# Patient Record
Sex: MALE | Race: BLACK OR AFRICAN AMERICAN | URBAN - METROPOLITAN AREA
[De-identification: names, ages, dates, MRNs, and addresses within clinical notes are randomized per-mention and may not be internally consistent; named-entity substitution may affect disease eponyms.]

---

## 2017-12-12 ENCOUNTER — NURSE TRIAGE (OUTPATIENT)
Dept: NURSING | Facility: CLINIC | Age: 42
End: 2017-12-12

## 2017-12-12 NOTE — TELEPHONE ENCOUNTER
"History or rectal fissure. Has had severe bleeding and pain with bowel movements for about 2 months. I instructed ER now. Fermin stated understanding and agreement.  Qi FARRIS RN [facility]         Reason for Disposition    SEVERE rectal bleeding (large blood clots; on and off, or constant bleeding)    Additional Information    Negative: Shock suspected (e.g., cold/pale/clammy skin, too weak to stand, low BP, rapid pulse)    Negative: Difficult to awaken or acting confused  (e.g., disoriented, slurred speech)    Negative: Passed out (i.e., lost consciousness, collapsed and was not responding)    Negative: [1] Vomiting AND [2] contains red blood or black (\"coffee ground\") material  (Exception: few red streaks in vomit that only happened once)    Negative: Sounds like a life-threatening emergency to the triager    Negative: Diarrhea is main symptom    Negative: Stool color other than brown or tan is main concern  (no bleeding and no melena)    Protocols used: RECTAL BLEEDING-ADULT-    "

## 2017-12-14 ENCOUNTER — HOSPITAL ENCOUNTER (EMERGENCY)
Facility: CLINIC | Age: 42
Discharge: HOME | End: 2017-12-14
Attending: EMERGENCY MEDICINE | Admitting: EMERGENCY MEDICINE
Payer: COMMERCIAL

## 2017-12-14 VITALS
OXYGEN SATURATION: 98 % | RESPIRATION RATE: 18 BRPM | WEIGHT: 122.2 LBS | SYSTOLIC BLOOD PRESSURE: 103 MMHG | HEART RATE: 88 BPM | TEMPERATURE: 98.3 F | DIASTOLIC BLOOD PRESSURE: 57 MMHG

## 2017-12-14 DIAGNOSIS — K60.2 ANAL FISSURE: ICD-10-CM

## 2017-12-14 LAB
ANION GAP SERPL CALCULATED.3IONS-SCNC: 8 MMOL/L (ref 3–14)
BASOPHILS # BLD AUTO: 0.1 10E9/L (ref 0–0.2)
BASOPHILS NFR BLD AUTO: 0.7 %
BUN SERPL-MCNC: 13 MG/DL (ref 7–30)
CALCIUM SERPL-MCNC: 8.9 MG/DL (ref 8.5–10.1)
CHLORIDE SERPL-SCNC: 103 MMOL/L (ref 94–109)
CO2 SERPL-SCNC: 30 MMOL/L (ref 20–32)
CREAT SERPL-MCNC: 1.01 MG/DL (ref 0.66–1.25)
DIFFERENTIAL METHOD BLD: NORMAL
EOSINOPHIL # BLD AUTO: 0.2 10E9/L (ref 0–0.7)
EOSINOPHIL NFR BLD AUTO: 2.2 %
ERYTHROCYTE [DISTWIDTH] IN BLOOD BY AUTOMATED COUNT: 12.8 % (ref 10–15)
GFR SERPL CREATININE-BSD FRML MDRD: 80 ML/MIN/1.7M2
GLUCOSE SERPL-MCNC: 82 MG/DL (ref 70–99)
HCT VFR BLD AUTO: 44.3 % (ref 40–53)
HGB BLD-MCNC: 14.8 G/DL (ref 13.3–17.7)
IMM GRANULOCYTES # BLD: 0 10E9/L (ref 0–0.4)
IMM GRANULOCYTES NFR BLD: 0.1 %
INR PPP: 1.02 (ref 0.86–1.14)
LYMPHOCYTES # BLD AUTO: 2.1 10E9/L (ref 0.8–5.3)
LYMPHOCYTES NFR BLD AUTO: 26.5 %
MCH RBC QN AUTO: 31 PG (ref 26.5–33)
MCHC RBC AUTO-ENTMCNC: 33.4 G/DL (ref 31.5–36.5)
MCV RBC AUTO: 93 FL (ref 78–100)
MONOCYTES # BLD AUTO: 0.6 10E9/L (ref 0–1.3)
MONOCYTES NFR BLD AUTO: 7.2 %
NEUTROPHILS # BLD AUTO: 5.1 10E9/L (ref 1.6–8.3)
NEUTROPHILS NFR BLD AUTO: 63.3 %
NRBC # BLD AUTO: 0 10*3/UL
NRBC BLD AUTO-RTO: 0 /100
PLATELET # BLD AUTO: 207 10E9/L (ref 150–450)
POTASSIUM SERPL-SCNC: 3.8 MMOL/L (ref 3.4–5.3)
RBC # BLD AUTO: 4.77 10E12/L (ref 4.4–5.9)
SODIUM SERPL-SCNC: 141 MMOL/L (ref 133–144)
TSH SERPL DL<=0.005 MIU/L-ACNC: 1.43 MU/L (ref 0.4–4)
WBC # BLD AUTO: 8 10E9/L (ref 4–11)

## 2017-12-14 PROCEDURE — 80048 BASIC METABOLIC PNL TOTAL CA: CPT | Performed by: EMERGENCY MEDICINE

## 2017-12-14 PROCEDURE — 85025 COMPLETE CBC W/AUTO DIFF WBC: CPT | Performed by: EMERGENCY MEDICINE

## 2017-12-14 PROCEDURE — 99284 EMERGENCY DEPT VISIT MOD MDM: CPT | Mod: Z6 | Performed by: EMERGENCY MEDICINE

## 2017-12-14 PROCEDURE — 99283 EMERGENCY DEPT VISIT LOW MDM: CPT | Performed by: EMERGENCY MEDICINE

## 2017-12-14 PROCEDURE — 85610 PROTHROMBIN TIME: CPT | Performed by: EMERGENCY MEDICINE

## 2017-12-14 PROCEDURE — 84443 ASSAY THYROID STIM HORMONE: CPT | Performed by: EMERGENCY MEDICINE

## 2017-12-14 PROCEDURE — 25000132 ZZH RX MED GY IP 250 OP 250 PS 637: Performed by: EMERGENCY MEDICINE

## 2017-12-14 RX ORDER — ACETAMINOPHEN 500 MG
1000 TABLET ORAL ONCE
Status: COMPLETED | OUTPATIENT
Start: 2017-12-14 | End: 2017-12-14

## 2017-12-14 RX ORDER — NITROGLYCERIN 4 MG/G
1 OINTMENT RECTAL EVERY 12 HOURS
Qty: 30 G | Refills: 0 | Status: SHIPPED | OUTPATIENT
Start: 2017-12-14

## 2017-12-14 RX ADMIN — ACETAMINOPHEN 1000 MG: 500 TABLET ORAL at 19:26

## 2017-12-14 ASSESSMENT — ENCOUNTER SYMPTOMS
DIARRHEA: 0
COLOR CHANGE: 0
BLOOD IN STOOL: 1
FEVER: 0
NECK STIFFNESS: 0
CONFUSION: 0
ABDOMINAL PAIN: 0
EYE REDNESS: 0
SHORTNESS OF BREATH: 0
ANAL BLEEDING: 1
DIFFICULTY URINATING: 0
CONSTIPATION: 1
HEADACHES: 0
RECTAL PAIN: 1
ARTHRALGIAS: 0

## 2017-12-14 NOTE — ED AVS SNAPSHOT
[facility], Emergency Department    [address]    [place] [address]    Phone:  [phone]    Fax:  [phone]                                       Fermin Atwood   MRN: [id number]    Department:  [facility], Emergency Department   Date of Visit:  12/14/2017           After Visit Summary Signature Page     I have received my discharge instructions, and my questions have been answered. I have discussed any challenges I see with this plan with the nurse or doctor.    ..........................................................................................................................................  Patient/Patient Representative Signature      ..........................................................................................................................................  Patient Representative Print Name and Relationship to Patient    ..................................................               ................................................  Date                                            Time    ..........................................................................................................................................  Reviewed by Signature/Title    ...................................................              ..............................................  Date                                                            Time

## 2017-12-14 NOTE — ED AVS SNAPSHOT
[facility], Emergency Department    [address]    [facility] [place] [place] [address]    Phone:  [phone]    Fax:  [phone]                                       Fermin Atwood   MRN: [id number]    Department:  [facility], Emergency Department   Date of Visit:  12/14/2017           Patient Information     Date Of Birth          1975        Your diagnoses for this visit were:     Anal fissure        You were seen by Mesfin Mcnair MD.        Discharge Instructions       Continue stool softener.  Apply nitroglycerin ointment twice daily as directed.  Apply lidocaine jelly 3 times daily as needed for pain  Take Citrucel or increase fiber as directed.  Drink plenty of fluids.    Please make an appointment to follow up with [facility] (phone: [phone]). Call tomorrow for an appointment.     Discharge References/Attachments     SITZ BATH, TAKING A (ENGLISH)      24 Hour Appointment Hotline       To make an appointment at any [facility] clinic, call [phone] ([phone]). If you don't have a family doctor or clinic, we will help you find one. [facility] clinics are conveniently located to serve the needs of you and your family.             Review of your medicines      START taking        Dose / Directions Last dose taken    lidocaine 2 % topical gel   Commonly known as:  XYLOCAINE   Quantity:  30 mL        Apply topically 3 times daily Place a pea sized amount on gloved finger and apply rectally.   Refills:  0        methylcellulose 500 MG Tabs tablet   Commonly known as:  CITRUCEL   Dose:  500 mg   Quantity:  30 each        Take 1 tablet (500 mg) by mouth daily   Refills:  0        nitroGLYcerin 0.4 % Oint rectal ointment   Commonly known as:  RECTIV   Dose:  1 inch   Quantity:  30 g        Place 1 inch (1.5 mg) rectally every 12 hours .  Wear a glove when applying.   Refills:  0                Prescriptions were sent or printed at these locations (3 Prescriptions)             "       Other Prescriptions                Printed at Department/Unit printer (3 of 3)         nitroGLYcerin (RECTIV) 0.4 % OINT rectal ointment               methylcellulose (CITRUCEL) 500 MG TABS tablet               lidocaine (XYLOCAINE) 2 % topical gel                Procedures and tests performed during your visit     Basic metabolic panel    CBC with platelets differential    INR    Peripheral IV catheter    TSH with free T4 reflex      Orders Needing Specimen Collection     None      Pending Results     No orders found from 12/12/2017 to 12/15/2017.            Pending Culture Results     No orders found from 12/12/2017 to 12/15/2017.            Pending Results Instructions     If you had any lab results that were not finalized at the time of your Discharge, you can call the ED Lab Result RN at [phone]. You will be contacted by this team for any positive Lab results or changes in treatment. The nurses are available 7 days a week from 10A to 6:30P.  You can leave a message 24 hours per day and they will return your call.        Thank you for choosing [facility]       Thank you for choosing [facility] for your care. Our goal is always to provide you with excellent care. Hearing back from our patients is one way we can continue to improve our services. Please take a few minutes to complete the written survey that you may receive in the mail after you visit with us. Thank you!        [employer] Information     [email or web address] lets you send messages to your doctor, view your test results, renew your prescriptions, schedule appointments and more. To sign up, go to [email or web address] . Click on \"Log in\" on the left side of the screen, which will take you to the Welcome page. Then click on \"Sign up Now\" on the right side of the page.     You will be asked to enter the access code listed below, as well as some personal information. Please follow the directions to create your username and password.     Your access code is: " [email or web address]  Expires: 3/14/2018  8:20 PM     Your access code will  in 90 days. If you need help or a new code, please call your [place] clinic or [phone].        Care EveryWhere ID     This is your Care EveryWhere ID. This could be used by other organizations to access your [place] medical records  [id number]        Equal Access to Services     [facility] HospitalMACRINA : Hadii karl velazquez Somillie, waaxda luqadaha, qaybta kaalmada adejacekda, nelia corbett . So [facility] [phone].    ATENCIÓN: Si habla español, tiene a branch disposición servicios gratuitos de asistencia lingüística. Llame al [phone].    We comply with applicable federal civil rights laws and [place] laws. We do not discriminate on the basis of race, color, national origin, age, disability, sex, sexual orientation, or gender identity.            After Visit Summary       This is your record. Keep this with you and show to your community pharmacist(s) and doctor(s) at your next visit.

## 2017-12-15 NOTE — TELEPHONE ENCOUNTER
APPT INFO    Date /Time: 12/29/17 1:45PM   Reason for Appt: In ED on 12/12/17 for SEVERE rectal bleeding    Ref Provider/Clinic: ED F/u appt    Are there internal records? Yes/No?  IF YES, list clinic names: [facility] ED Note 12/14/17   Examination under anesthesia, sphincterotomy, and proctoscopy Jose L MOYER 6/17/2009   Are there outside records? Yes/No? No external recs    Patient Contact (Y/N) & Call Details: No, pt f/u from ED Visit   Action: Closing encounter

## 2017-12-15 NOTE — DISCHARGE INSTRUCTIONS
Continue stool softener.  Apply nitroglycerin ointment twice daily as directed.  Apply lidocaine jelly 3 times daily as needed for pain  Take Citrucel or increase fiber as directed.  Drink plenty of fluids.    Please make an appointment to follow up with [facility] (phone: [phone]). Call tomorrow for an appointment.

## 2017-12-15 NOTE — ED PROVIDER NOTES
History     Chief Complaint   Patient presents with     Rectal Bleeding     increased bleeding, pain w/b.m. x2mos. Hx several surgeries for anal fissure, last was 2009. Pt also reports recent weight loss.      ROSALBA Atwood is a 42 year old male who is emergency department with a 3 month history of rectal pain and rectal bleeding after bowel movements.  Patient has a history of anal fissure.  He has had 3 procedures previously, most recently in 2009 when he had an EUA, sphincterotomy, and proctoscopy.  Patient states that for the past 3 months, he has been having rectal pain after he has a bowel movement.  He states that he has also had red bleeding after bowel movements.  He states the amount of bleeding is typically related to how constipated he is.  He states he is currently taking stool softeners.  Patient states that he called the clinic today to make an appointment and was referred to the emergency department.  He states that his symptoms are not particularly bad today.  He denies any abdominal pain.  He currently has some rectal pain.  He denies any fever.  He denies nausea vomiting.  Denies any chest pain or dyspnea.  He denies lightheadedness.  Patient denies anticoagulant use.    I have reviewed the Medications, Allergies, Past Medical and Surgical History, and Social History in the Epic system.    Review of Systems   Constitutional: Negative for fever.   HENT: Negative for congestion.    Eyes: Negative for redness.   Respiratory: Negative for shortness of breath.    Cardiovascular: Negative for chest pain.   Gastrointestinal: Positive for anal bleeding, blood in stool, constipation and rectal pain. Negative for abdominal pain and diarrhea.   Genitourinary: Negative for difficulty urinating.   Musculoskeletal: Negative for arthralgias and neck stiffness.   Skin: Negative for color change.   Neurological: Negative for headaches.   Psychiatric/Behavioral: Negative for confusion.   All other  systems reviewed and are negative.      Physical Exam   BP: 122/56  Pulse: 102  Temp: 97.3  F (36.3  C)  Resp: 16  Weight: 55.4 kg (122 lb 3.2 oz)  SpO2: 97 %      Physical Exam   Constitutional: He appears well-developed and well-nourished.   Cardiovascular: Normal rate, regular rhythm and normal heart sounds.    Pulmonary/Chest: Effort normal and breath sounds normal. No respiratory distress.   Abdominal: Soft. Bowel sounds are normal. There is no tenderness.   Genitourinary: Rectal exam shows fissure (anterior), tenderness and guaiac positive stool. Rectal exam shows no external hemorrhoid and no mass.   Neurological: He is alert. He has normal strength.   Skin: Skin is warm and dry.   Nursing note and vitals reviewed.      ED Course     ED Course     Procedures            Critical Care time:    Results for orders placed or performed during the hospital encounter of 12/14/17   CBC with platelets differential   Result Value Ref Range    WBC 8.0 4.0 - 11.0 10e9/L    RBC Count 4.77 4.4 - 5.9 10e12/L    Hemoglobin 14.8 13.3 - 17.7 g/dL    Hematocrit 44.3 40.0 - 53.0 %    MCV 93 78 - 100 fl    MCH 31.0 26.5 - 33.0 pg    MCHC 33.4 31.5 - 36.5 g/dL    RDW 12.8 10.0 - 15.0 %    Platelet Count 207 150 - 450 10e9/L    Diff Method Automated Method     % Neutrophils 63.3 %    % Lymphocytes 26.5 %    % Monocytes 7.2 %    % Eosinophils 2.2 %    % Basophils 0.7 %    % Immature Granulocytes 0.1 %    Nucleated RBCs 0 0 /100    Absolute Neutrophil 5.1 1.6 - 8.3 10e9/L    Absolute Lymphocytes 2.1 0.8 - 5.3 10e9/L    Absolute Monocytes 0.6 0.0 - 1.3 10e9/L    Absolute Eosinophils 0.2 0.0 - 0.7 10e9/L    Absolute Basophils 0.1 0.0 - 0.2 10e9/L    Abs Immature Granulocytes 0.0 0 - 0.4 10e9/L    Absolute Nucleated RBC 0.0    Basic metabolic panel   Result Value Ref Range    Sodium 141 133 - 144 mmol/L    Potassium 3.8 3.4 - 5.3 mmol/L    Chloride 103 94 - 109 mmol/L    Carbon Dioxide 30 20 - 32 mmol/L    Anion Gap 8 3 - 14 mmol/L     Glucose 82 70 - 99 mg/dL    Urea Nitrogen 13 7 - 30 mg/dL    Creatinine 1.01 0.66 - 1.25 mg/dL    GFR Estimate 80 >60 mL/min/1.7m2    GFR Estimate If Black >90 >60 mL/min/1.7m2    Calcium 8.9 8.5 - 10.1 mg/dL   INR   Result Value Ref Range    INR 1.02 0.86 - 1.14   TSH with free T4 reflex   Result Value Ref Range    TSH 1.43 0.40 - 4.00 mU/L               Assessments & Plan (with Medical Decision Making)   42 year old male with history of anal fissure to the emergency department with 3 months of rectal pain and bleeding after constipated bowel movements for the past 3 months.  In the emergency department, the patient has tenderness of the anal sphincter anteriorly consistent with an anal fissure.  His abdomen and remainder of his rectal exam is normal.  Patient's labs are normal.  Patient will be discharged to home with nitroglycerin ointment as well as lidocaine gel.  He will continue oral stool softeners.  Methylcellulose supplement also prescribed.  Colorectal surgery follow-up recommended.    I have reviewed the nursing notes.    I have reviewed the findings, diagnosis, plan and need for follow up with the patient.    Discharge Medication List as of 12/14/2017  8:20 PM      START taking these medications    Details   nitroGLYcerin (RECTIV) 0.4 % OINT rectal ointment Place 1 inch (1.5 mg) rectally every 12 hours .  Wear a glove when applying., Disp-30 g, R-0, Local Print      methylcellulose (CITRUCEL) 500 MG TABS tablet Take 1 tablet (500 mg) by mouth daily, Disp-30 each, R-0, Local Print      lidocaine (XYLOCAINE) 2 % topical gel Apply topically 3 times daily Place a pea sized amount on gloved finger and apply rectally.Disp-30 mL, R-0Local Print             Final diagnoses:   Anal fissure       12/14/2017   [facility], [place], EMERGENCY DEPARTMENT     Mesfin Mcnair MD  12/14/17 7233

## 2017-12-29 ENCOUNTER — OFFICE VISIT (OUTPATIENT)
Dept: SURGERY | Facility: CLINIC | Age: 42
End: 2017-12-29
Payer: COMMERCIAL

## 2017-12-29 ENCOUNTER — PRE VISIT (OUTPATIENT)
Dept: SURGERY | Facility: CLINIC | Age: 42
End: 2017-12-29

## 2017-12-29 VITALS
HEART RATE: 67 BPM | OXYGEN SATURATION: 98 % | WEIGHT: 124.9 LBS | SYSTOLIC BLOOD PRESSURE: 115 MMHG | DIASTOLIC BLOOD PRESSURE: 88 MMHG | HEIGHT: 63 IN | TEMPERATURE: 97.5 F | BODY MASS INDEX: 22.13 KG/M2

## 2017-12-29 DIAGNOSIS — K62.89 ANAL PAIN: Primary | ICD-10-CM

## 2017-12-29 DIAGNOSIS — K62.5 RECTAL BLEEDING: ICD-10-CM

## 2017-12-29 ASSESSMENT — ENCOUNTER SYMPTOMS
HEARTBURN: 1
BLOOD IN STOOL: 1
DIARRHEA: 0
BLOATING: 0
CONSTIPATION: 0
NAUSEA: 0
BOWEL INCONTINENCE: 0
VOMITING: 0
RECTAL PAIN: 1
ABDOMINAL PAIN: 0
JAUNDICE: 0

## 2017-12-29 ASSESSMENT — PAIN SCALES - GENERAL: PAINLEVEL: MODERATE PAIN (5)

## 2017-12-29 NOTE — PROGRESS NOTES
Colon and Rectal Surgery Consult Clinic Note    Date: 2017     Referring provider:  Referred Self, MD  No address on file     RE: Fermin Atwood  : 1975  RUBEN: 2017    Fermin Atwood is a very pleasant 42 year old male with a history of recurrent anal fissures with a recent diagnosis of anal fissure.  Given these findings they were subsequently sent to the [facility] for an opinion on this and a new patient consultation.     Fermin presents today with his son.  He has a history of recurrent anal fissures.  He had 2 surgeries in [facility] for this and a surgery in [place].  In  he underwent examination under anesthesia, sphincterotomy, and proctoscopy with Dr. Domingo for anal fissure.  He had been doing well until about 3 months ago when his pain and bleeding returned.  This occurred after hard bowel movements.  He now has sharp pain with bowel movements and bright red blood after bowel movements.  He states that the amount of bleeding seems to be related to how constipated he has.  He had a soft bowel movement today and had no bleeding and no pain with it.  He was seen in the emergency room on 2017 and diagnosed with an anal fissure.  He was prescribed nitroglycerin cream but states that he has only used this a few times.  He reports that he had a colonoscopy back before his surgery in  and that was normal, although there is no record in our system from this.  He denies any family history of any colon cancer.    Assessment/Plan: 42 year old male without a significant past medical history with rectal pain and bleeding.  Exam is very limited due to patient discomfort.  He has large internal hemorrhoids and some scarring in the right lateral position.  I cannot clearly identify an anal fissure but his symptoms seem most consistent with that especially in the setting of constipation.  I recommended that we treat for an anal fissure as he has had recurrent  "fissures in the past.  Will treat with topical diltiazem.  Discussed the importance of long-term management of constipation.  Would like him to take a fiber supplement 2-3 times a day and add in the laxative in addition as needed.  Warm tub baths, Tylenol, and ibuprofen for any pain.  If symptoms do not improve after 4 weeks, would consider EUA to further evaluate for other sources of pain.  I discussed that he does have large internal hemorrhoids but I think it is unlikely that these are causing his pain but they could be contributing to a fissure having difficulty healing.  Patient's questions were answered to his stated satisfaction and he is in agreement with this plan.    Medical history:  No past medical history on file.    Surgical history:  No past surgical history on file.    Problem list:  There are no active problems to display for this patient.      Medications:  Current Outpatient Prescriptions   Medication Sig Dispense Refill     diltiazem 2% in PLO cream, FV COMPOUNDED, 2% GEL To anal opening three times daily.  Use a pea-sized amount.  Store at room temperature. 60 g 0     diltiazem 2% in PLO cream, FV COMPOUNDED, 2% GEL To anal opening three times daily.  Use a pea-sized amount.  Store at room temperature. 60 g 0     nitroGLYcerin (RECTIV) 0.4 % OINT rectal ointment Place 1 inch (1.5 mg) rectally every 12 hours .  Wear a glove when applying. 30 g 0     methylcellulose (CITRUCEL) 500 MG TABS tablet Take 1 tablet (500 mg) by mouth daily 30 each 0     lidocaine (XYLOCAINE) 2 % topical gel Apply topically 3 times daily Place a pea sized amount on gloved finger and apply rectally. 30 mL 0       Allergies:  Allergies   Allergen Reactions     Aspirin Swelling     \"my face gets swollen\"     Ibuprofen Swelling     \"My face gets swollen\"       Family history:  No family history on file.    Social history:  Social History   Substance Use Topics     Smoking status: Current Some Day Smoker     Smokeless tobacco: " "Never Used     Alcohol use No    Marital status: .    Nursing Notes:   Divya Freeman LPN  12/29/2017  2:12 PM  Signed  Chief Complaint   Patient presents with     Clinic Care Coordination - Initial     new       Vitals:    12/29/17 1409   BP: 115/88   Pulse: 67   Temp: 97.5  F (36.4  C)   TempSrc: Oral   SpO2: 98%   Weight: 56.7 kg (124 lb 14.4 oz)   Height: 1.6 m (5' 3\")       Body mass index is 22.13 kg/(m^2).      Divya CHEW LPN                          Physical Examination:  /88  Pulse 67  Temp 97.5  F (36.4  C) (Oral)  Ht 5' 3\"  Wt 124 lb 14.4 oz  SpO2 98%  BMI 22.13 kg/m2  General: Alert, oriented, in no acute distress, sitting comfortably  HEENT: Mucous membranes moist  Perianal external examination:  Perianal skin: Intact with no excoriation or lichenification.  Lesions: No evidence of an external lesion, nodularity, or induration in the perianal region.  Eversion of buttocks: There is some scarring in the right lateral position and prolapsing internal hemorrhoids present without bleeding.  Exam limited due to the extent of hemorrhoids.    Digital rectal examination: Was performed.   Sphincter tone: Hypertonic.  Palpable lesions: No.  Prostate: Not assessed.  Other: None.      Anoscopy: Was performed but was very limited due to discomfort.  Large internal hemorrhoids visualized without any active bleeding.      Total face to face time was 30 minutes, >50% counseling.    SABINA Morin, NP-C  Colon and Rectal Surgery   [facility]    This note was created using speech recognition software and may contain unintended word substitutions.  "

## 2017-12-29 NOTE — PATIENT INSTRUCTIONS
1. Diltiazem ointment 2% to be applied 3 times daily for 8 weeks  2. Fiber supplement such as Metamcuil, Citrucel, or Benefiber powder once to twice a day  3. MiraLax or Milk of Magnesia if still constipated  4. Drink 10 glasses of water a day  5. Tylenol, ibuprofen, and warm tub baths/sitz baths for pain  6. Follow up in 8 weeks. Follow up sooner if symptoms do not improve after 4 weeks.

## 2017-12-29 NOTE — MR AVS SNAPSHOT
After Visit Summary   2017    Fermin Atwood    MRN: [id number]           Patient Information     Date Of Birth          1975        Visit Information        Provider Department      2017 2:00 PM Louise Aviles APRN [facility] Colon and Rectal Surgery        Today's Diagnoses     Anal pain    -  1      Care Instructions    1. Diltiazem ointment 2% to be applied 3 times daily for 8 weeks  2. Fiber supplement such as Metamcuil, Citrucel, or Benefiber powder once to twice a day  3. MiraLax or Milk of Magnesia if still constipated  4. Drink 10 glasses of water a day  5. Tylenol, ibuprofen, and warm tub baths/sitz baths for pain  6. Follow up in 8 weeks. Follow up sooner if symptoms do not improve after 4 weeks.            Follow-ups after your visit        Who to contact     Please call your clinic at [phone] to:    Ask questions about your health    Make or cancel appointments    Discuss your medicines    Learn about your test results    Speak to your doctor   If you have compliments or concerns about an experience at your clinic, or if you wish to file a complaint, please contact [facility] Patient Relations at [phone] or email us at [email or web address]         Additional Information About Your Visit        MyChart Information     Maktoob is an electronic gateway that provides easy, online access to your medical records. With Maktoob, you can request a clinic appointment, read your test results, renew a prescription or communicate with your care team.     To sign up for Maktoob visit the website at [email or web address]   You will be asked to enter the access code listed below, as well as some personal information. Please follow the directions to create your username and password.     Your access code is: [email or web address]  Expires: 3/14/2018  8:20 PM     Your access code will  in 90 days. If you need help or a  "new code, please contact your [facility] or call [phone] for assistance.        Care EveryWhere ID     This is your Care EveryWhere ID. This could be used by other organizations to access your [place] medical records  [id number]        Your Vitals Were     Pulse Temperature Height Pulse Oximetry BMI (Body Mass Index)       67 97.5  F (36.4  C) (Oral) 5' 3\" 98% 22.13 kg/m2        Blood Pressure from Last 3 Encounters:   12/29/17 115/88   12/14/17 103/57    Weight from Last 3 Encounters:   12/29/17 124 lb 14.4 oz   12/14/17 122 lb 3.2 oz              Today, you had the following     No orders found for display         Today's Medication Changes          These changes are accurate as of: 12/29/17  2:45 PM.  If you have any questions, ask your nurse or doctor.               Start taking these medicines.        Dose/Directions    * diltiazem 2% in PLO cream (FV COMPOUNDED) 2% Gel   Used for:  Anal pain   Started by:  Louise Aviles APRN CNP        To anal opening three times daily.  Use a pea-sized amount.  Store at room temperature.   Quantity:  60 g   Refills:  0       * diltiazem 2% in PLO cream (FV COMPOUNDED) 2% Gel   Used for:  Anal pain   Started by:  Louise Aviles APRN CNP        To anal opening three times daily.  Use a pea-sized amount.  Store at room temperature.   Quantity:  60 g   Refills:  0       * Notice:  This list has 2 medication(s) that are the same as other medications prescribed for you. Read the directions carefully, and ask your doctor or other care provider to review them with you.         Where to get your medicines      These medications were sent to [facility] - [place], [place] - [address]   [address] , [place] [facility] [address]     Phone:  [phone]     diltiazem 2% in PLO cream (FV COMPOUNDED) 2% Gel         Some of these will need a paper prescription and others can be bought over the " counter.  Ask your nurse if you have questions.     Bring a paper prescription for each of these medications     diltiazem 2% in PLO cream (FV COMPOUNDED) 2% Gel                Primary Care Provider Fax #    Physician No Ref-Primary [phone]       No address on file        Equal Access to Services     RAUL TORSTEN : Hadii karl mcbride caroline Sosallyali, waaxda luqadaha, qaybta kaalmada adeegyada, nelia donavonin hayaaerin valadezmonica alvarado aric arnold. So [facility] [phone].    ATENCIÓN: Si habla español, tiene a branch disposición servicios gratuitos de asistencia lingüística. Llame al [phone].    We comply with applicable federal civil rights laws and [place] laws. We do not discriminate on the basis of race, color, national origin, age, disability, sex, sexual orientation, or gender identity.            Thank you!     Thank you for choosing [facility] COLON AND RECTAL SURGERY  for your care. Our goal is always to provide you with excellent care. Hearing back from our patients is one way we can continue to improve our services. Please take a few minutes to complete the written survey that you may receive in the mail after your visit with us. Thank you!             Your Updated Medication List - Protect others around you: Learn how to safely use, store and throw away your medicines at www.disposemymeds.org.          This list is accurate as of: 12/29/17  2:45 PM.  Always use your most recent med list.                   Brand Name Dispense Instructions for use Diagnosis    * diltiazem 2% in PLO cream (FV COMPOUNDED) 2% Gel     60 g    To anal opening three times daily.  Use a pea-sized amount.  Store at room temperature.    Anal pain       * diltiazem 2% in PLO cream (FV COMPOUNDED) 2% Gel     60 g    To anal opening three times daily.  Use a pea-sized amount.  Store at room temperature.    Anal pain       lidocaine 2 % topical gel    XYLOCAINE    30 mL    Apply topically 3 times daily Place a pea sized amount on gloved finger and  apply rectally.        methylcellulose 500 MG Tabs tablet    CITRUCEL    30 each    Take 1 tablet (500 mg) by mouth daily        nitroGLYcerin 0.4 % Oint rectal ointment    RECTIV    30 g    Place 1 inch (1.5 mg) rectally every 12 hours .  Wear a glove when applying.        * Notice:  This list has 2 medication(s) that are the same as other medications prescribed for you. Read the directions carefully, and ask your doctor or other care provider to review them with you.

## 2017-12-29 NOTE — NURSING NOTE
"Chief Complaint   Patient presents with     Clinic Care Coordination - Initial     new       Vitals:    12/29/17 1409   BP: 115/88   Pulse: 67   Temp: 97.5  F (36.4  C)   TempSrc: Oral   SpO2: 98%   Weight: 56.7 kg (124 lb 14.4 oz)   Height: 1.6 m (5' 3\")       Body mass index is 22.13 kg/(m^2).      Divya CHEW LPN                       "

## 2017-12-29 NOTE — LETTER
2017      RE: Fermin Atwood  [address]   [place] [address]       Colon and Rectal Surgery Consult Clinic Note    Date: 2017     Referring provider:  Referred Self, MD  No address on file     RE: Fermin Atwood  : 1975  RUBEN: 2017    Fermin Atwood is a very pleasant 42 year old male with a history of recurrent anal fissures with a recent diagnosis of anal fissure.  Given these findings they were subsequently sent to the Colon and Rectal Surgery Clinic for an opinion on this and a new patient consultation.     Fermin presents today with his son.  He has a history of recurrent anal fissures.  He had 2 surgeries in [facility] for this and a surgery in [place].  In  he underwent examination under anesthesia, sphincterotomy, and proctoscopy with Dr. Domingo for anal fissure.  He had been doing well until about 3 months ago when his pain and bleeding returned.  This occurred after hard bowel movements.  He now has sharp pain with bowel movements and bright red blood after bowel movements.  He states that the amount of bleeding seems to be related to how constipated he has.  He had a soft bowel movement today and had no bleeding and no pain with it.  He was seen in the emergency room on 2017 and diagnosed with an anal fissure.  He was prescribed nitroglycerin cream but states that he has only used this a few times.  He reports that he had a colonoscopy back before his surgery in  and that was normal, although there is no record in our system from this.  He denies any family history of any colon cancer.    Assessment/Plan: 42 year old male without a significant past medical history with rectal pain and bleeding.  Exam is very limited due to patient discomfort.  He has large internal hemorrhoids and some scarring in the right lateral position.  I cannot clearly identify an anal fissure but his symptoms seem most consistent with that especially in the  "setting of constipation.  I recommended that we treat for an anal fissure as he has had recurrent fissures in the past.  Will treat with topical diltiazem.  Discussed the importance of long-term management of constipation.  Would like him to take a fiber supplement 2-3 times a day and add in the laxative in addition as needed.  Warm tub baths, Tylenol, and ibuprofen for any pain.  If symptoms do not improve after 4 weeks, would consider EUA to further evaluate for other sources of pain.  I discussed that he does have large internal hemorrhoids but I think it is unlikely that these are causing his pain but they could be contributing to a fissure having difficulty healing.  Patient's questions were answered to his stated satisfaction and he is in agreement with this plan.    Medical history:  No past medical history on file.    Surgical history:  No past surgical history on file.    Problem list:  There are no active problems to display for this patient.      Medications:  Current Outpatient Prescriptions   Medication Sig Dispense Refill     diltiazem 2% in PLO cream, FV COMPOUNDED, 2% GEL To anal opening three times daily.  Use a pea-sized amount.  Store at room temperature. 60 g 0     diltiazem 2% in PLO cream, FV COMPOUNDED, 2% GEL To anal opening three times daily.  Use a pea-sized amount.  Store at room temperature. 60 g 0     nitroGLYcerin (RECTIV) 0.4 % OINT rectal ointment Place 1 inch (1.5 mg) rectally every 12 hours .  Wear a glove when applying. 30 g 0     methylcellulose (CITRUCEL) 500 MG TABS tablet Take 1 tablet (500 mg) by mouth daily 30 each 0     lidocaine (XYLOCAINE) 2 % topical gel Apply topically 3 times daily Place a pea sized amount on gloved finger and apply rectally. 30 mL 0       Allergies:  Allergies   Allergen Reactions     Aspirin Swelling     \"my face gets swollen\"     Ibuprofen Swelling     \"My face gets swollen\"       Family history:  No family history on file.    Social history:  Social " "History   Substance Use Topics     Smoking status: Current Some Day Smoker     Smokeless tobacco: Never Used     Alcohol use No    Marital status: .    Nursing Notes:   Divya FreemanAMAIRANI  12/29/2017  2:12 PM  Signed  Chief Complaint   Patient presents with     Clinic Care Coordination - Initial     new       Vitals:    12/29/17 1409   BP: 115/88   Pulse: 67   Temp: 97.5  F (36.4  C)   TempSrc: Oral   SpO2: 98%   Weight: 56.7 kg (124 lb 14.4 oz)   Height: 1.6 m (5' 3\")       Body mass index is 22.13 kg/(m^2).      Divya CHEW LPN                          Physical Examination:  /88  Pulse 67  Temp 97.5  F (36.4  C) (Oral)  Ht 5' 3\"  Wt 124 lb 14.4 oz  SpO2 98%  BMI 22.13 kg/m2  General: Alert, oriented, in no acute distress, sitting comfortably  HEENT: Mucous membranes moist  Perianal external examination:  Perianal skin: Intact with no excoriation or lichenification.  Lesions: No evidence of an external lesion, nodularity, or induration in the perianal region.  Eversion of buttocks: There is some scarring in the right lateral position and prolapsing internal hemorrhoids present without bleeding.  Exam limited due to the extent of hemorrhoids.    Digital rectal examination: Was performed.   Sphincter tone: Hypertonic.  Palpable lesions: No.  Prostate: Not assessed.  Other: None.      Anoscopy: Was performed but was very limited due to discomfort.  Large internal hemorrhoids visualized without any active bleeding.      Total face to face time was 30 minutes, >50% counseling.    SABINA Morin, NP-C  Colon and Rectal Surgery   [facility]    This note was created using speech recognition software and may contain unintended word substitutions.    SABINA Morin CNP      "

## 2018-01-03 ENCOUNTER — TELEPHONE (OUTPATIENT)
Dept: SURGERY | Facility: CLINIC | Age: 43
End: 2018-01-03

## 2018-01-03 ENCOUNTER — CARE COORDINATION (OUTPATIENT)
Dept: SURGERY | Facility: CLINIC | Age: 43
End: 2018-01-03

## 2018-01-03 NOTE — TELEPHONE ENCOUNTER
Patient called back again.  He's quite distressed. Medication won't be available for a couple of days.  Will also have to pay out of pocket for it.  He is requesting another medication (he feels he can't wait a couple of days for relief).  [phone]

## 2018-01-03 NOTE — TELEPHONE ENCOUNTER
Patient called in through triage wanting to discuss his compounding cream for his anal fissure. Patient states that the cream prescription has not arrived to the pharmacy. This RN will call the pharmacy and give them a verbal order for the cream. Patient verbalizes understanding.

## 2018-01-03 NOTE — PROGRESS NOTES
This patient called the triage line to discuss his anal fissure pain. This RN talked to the compounding pharmacy in regard to his diltiazem cream earlier today. This RN was told by the compound pharmacy that they had reached out to the patient several times in regard to getting his diltiazem cream for him. The patient states that he had talked with the compound pharmacy earlier today and they would have the cream ready for him tomorrow afternoon. The patient stated he could not wait until tomorrow as the pain was so severe. This RN continued to discuss using his lidocaine and tylenol for his anal pain. This RN also recommended that the patient take warm tub baths and the patient stated he took one yesterday with improvement to his pain. This RN reminded the patient that he could continue to take these warm baths along with the lidocaine cream and tylenol. Before this RN could question the patient in regard to his bowel habits the patient hung up on me after informing me to talk to the doctor and get him more mediations for the pain.

## 2018-01-04 ENCOUNTER — TELEPHONE (OUTPATIENT)
Dept: SURGERY | Facility: CLINIC | Age: 43
End: 2018-01-04

## 2018-01-04 NOTE — TELEPHONE ENCOUNTER
This RN talked to Fermin's daughter William in regard to the patient's pain. This RN information the patient's daughter that the pain would not improve until Fermin started to use the diltiazem. This RN gave the daughter the address and phone number to the compounding pharmacy and she stated she would be going to get that today. This RN also recommended that the patient take four warm baths daily, along with using the lidocaine cream and tylenol. This RN also reminded the patient that his stool needed to be as soft as possible and absolutely no straining with bowel movements. The daughter stated his bowel movements are still soft and he doesn't strain. This RN encouraged the patient to start using the diltiazem cream as soon as possible as it had been prescribed on 12/29/17. This RN also advised that the diltiazem cream can take up to four weeks to be totally effective. This RN advised the patient that if the pain wasn't better after using the diltiazem cream for four weeks he could make another appointment with Louise to discuss further treatment. This RN informed William that I discuss her father with Louise yesterday after talking with him on the phone and Louise said there was nothing more she could prescribe for him until he used the diltiazem cream for four consecutive weeks.

## 2018-02-05 ENCOUNTER — OFFICE VISIT (OUTPATIENT)
Dept: SURGERY | Facility: CLINIC | Age: 43
End: 2018-02-05

## 2018-02-05 VITALS
OXYGEN SATURATION: 99 % | DIASTOLIC BLOOD PRESSURE: 87 MMHG | HEIGHT: 63 IN | SYSTOLIC BLOOD PRESSURE: 120 MMHG | WEIGHT: 130.3 LBS | TEMPERATURE: 97.4 F | HEART RATE: 90 BPM | BODY MASS INDEX: 23.09 KG/M2

## 2018-02-05 DIAGNOSIS — K62.89 RECTAL PAIN: Primary | ICD-10-CM

## 2018-02-05 DIAGNOSIS — R30.0 DYSURIA: ICD-10-CM

## 2018-02-05 ASSESSMENT — PAIN SCALES - GENERAL: PAINLEVEL: SEVERE PAIN (6)

## 2018-02-05 NOTE — MR AVS SNAPSHOT
After Visit Summary   2/5/2018    Fermin Atwood    MRN: [id number]           Patient Information     Date Of Birth          1975        Visit Information        Provider Department      2/5/2018 3:45 PM Louise Aviles APRN [facility] Colon and Rectal Surgery        Today's Diagnoses     Rectal pain    -  1    Dysuria           Follow-ups after your visit        Additional Services     UROLOGY ADULT REFERRAL       Your provider has referred you to: [facility]: [place] [facility] Prostate and Urologic Cancers - [place] [phone]   [email or web address]    Please be aware that coverage of these services is subject to the terms and limitations of your health insurance plan.  Call member services at your health plan with any benefit or coverage questions.      Please bring the following with you to your appointment:    (1) Any X-Rays, CTs or MRIs which have been performed.  Contact the facility where they were done to arrange for  prior to your scheduled appointment.    (2) List of current medications  (3) This referral request   (4) Any documents/labs given to you for this referral                  Who to contact     Please call your clinic at [phone] to:    Ask questions about your health    Make or cancel appointments    Discuss your medicines    Learn about your test results    Speak to your doctor   If you have compliments or concerns about an experience at your clinic, or if you wish to file a complaint, please contact [facility] Patient Relations at [phone] or email us at [email or web address]         Additional Information About Your Visit        MyChart Information     RichRelevance is an electronic gateway that provides easy, online access to your medical records. With RichRelevance, you can request a clinic appointment, read your test results, renew a prescription or communicate with your care team.     To sign up for RichRelevance  "visit the website at [email or web address]   You will be asked to enter the access code listed below, as well as some personal information. Please follow the directions to create your username and password.     Your access code is: [email or web address]  Expires: 3/14/2018  8:20 PM     Your access code will  in 90 days. If you need help or a new code, please contact your [facility] or call [phone] for assistance.        Care EveryWhere ID     This is your Care EveryWhere ID. This could be used by other organizations to access your [place] medical records  [id number]        Your Vitals Were     Pulse Temperature Height Pulse Oximetry BMI (Body Mass Index)       90 97.4  F (36.3  C) (Oral) 5' 3\" 99% 23.08 kg/m2        Blood Pressure from Last 3 Encounters:   18 120/87   17 115/88   17 103/57    Weight from Last 3 Encounters:   18 130 lb 4.8 oz   17 124 lb 14.4 oz   17 122 lb 3.2 oz              We Performed the Following     UROLOGY ADULT REFERRAL        Primary Care Provider Fax #    Physician No Ref-Primary [phone]       No address on file        Equal Access to Services     RAUL SARMIENTO : Hadii karl ku hadasho Sosallyali, waaxda luqadaha, qaybta kaalmada adeegyada, nelia corbett . So [facility] [phone].    ATENCIÓN: Si habla español, tiene a branch disposición servicios gratuitos de asistencia lingüística. Llame al [phone].    We comply with applicable federal civil rights laws and [place] laws. We do not discriminate on the basis of race, color, national origin, age, disability, sex, sexual orientation, or gender identity.            Thank you!     Thank you for choosing [facility] COLON AND RECTAL SURGERY  for your care. Our goal is always to provide you with excellent care. Hearing back from our patients is one way we can continue to improve our services. Please take a few minutes to complete the written " survey that you may receive in the mail after your visit with us. Thank you!             Your Updated Medication List - Protect others around you: Learn how to safely use, store and throw away your medicines at www.disposemymeds.org.          This list is accurate as of 2/5/18  5:13 PM.  Always use your most recent med list.                   Brand Name Dispense Instructions for use Diagnosis    * diltiazem 2% in PLO cream (FV COMPOUNDED) 2% Gel     60 g    To anal opening three times daily.  Use a pea-sized amount.  Store at room temperature.    Anal pain       * diltiazem 2% in PLO cream (FV COMPOUNDED) 2% Gel     60 g    To anal opening three times daily.  Use a pea-sized amount.  Store at room temperature.    Anal pain       lidocaine 2 % topical gel    XYLOCAINE    30 mL    Apply topically 3 times daily Place a pea sized amount on gloved finger and apply rectally.        methylcellulose 500 MG Tabs tablet    CITRUCEL    30 each    Take 1 tablet (500 mg) by mouth daily        nitroGLYcerin 0.4 % Oint rectal ointment    RECTIV    30 g    Place 1 inch (1.5 mg) rectally every 12 hours .  Wear a glove when applying.        PREPARATION H EX           * Notice:  This list has 2 medication(s) that are the same as other medications prescribed for you. Read the directions carefully, and ask your doctor or other care provider to review them with you.

## 2018-02-05 NOTE — PROGRESS NOTES
Colon and Rectal Surgery Follow Up Clinic Note     Referring provider:  Referred Self, MD  No address on file     RE: Fermin Atwood  : 1975  RUBEN: 2018    Fermin Atwood is a very pleasant 42 year old male with a history of recurrent anal fissures with a recent diagnosis of rectal pain. He was seen in clinic on 17 and presents today for follow up.    Fermin presents today with his son.  He was seen at the end of December with rectal pain that occurred after hard bowel movements.  I started him on topical diltiazem.  However, his pain has been getting much worse.  The pain is now constant and not just with bowel movements.  He additionally has abdominal cramping followed by mucus discharge.  He states that the pain can be so severe that he gets dizzy and sweaty.  This occurs about 3 times a week.  The pain he describes as burning and throbbing.  He has been using the diltiazem 2-3 times a day but has not found this helpful.  He is also tried Preparation H suppositories and creams without improvement in symptoms.  His bowel movements have been soft with the use of a daily fiber supplement.  He additionally notices some bright red blood with bowel movements.  He has now developed rectal pain with urination and reports that it is difficult to urinate now and that he has to go back several times in order to completely empty his bladder.  This is a new symptom for him.  He denies any fevers or chills.  He has a history of recurrent anal fissures.  He had 2 surgeries in [facility] for this and a surgery in [place].  In  he underwent examination under anesthesia, sphincterotomy, and proctoscopy with Dr. Domingo for anal fissure.  He reports that he had a colonoscopy back before his surgery in  and that was normal, although there is no record in our system from this.  He denies any family history of any colon cancer.    Assessment/Plan: 42 year old male without a significant past medical history  "with rectal pain and bleeding.  Exam is very limited due to patient discomfort.  Prior exam showed a large internal hemorrhoids and some scarring in the right lateral position.  Today, he did not even tolerate digital rectal exam.  I am concerned that his urinary symptoms could be related to prostatitis and would like him to see urology soon.  However, given the severity of his rectal pain and that this is now constant, I would recommend an examination under anesthesia to further evaluate.  Advised continued warm tub baths, Tylenol, and ibuprofen for any pain. Patient's questions were answered to his stated satisfaction and he is in agreement with this plan.    Medical history:  No past medical history on file.    Surgical history:  No past surgical history on file.    Problem list:  There are no active problems to display for this patient.      Medications:  Current Outpatient Prescriptions   Medication Sig Dispense Refill     Witch Hazel (PREPARATION H EX)        diltiazem 2% in PLO cream, FV COMPOUNDED, 2% GEL To anal opening three times daily.  Use a pea-sized amount.  Store at room temperature. 60 g 0     diltiazem 2% in PLO cream, FV COMPOUNDED, 2% GEL To anal opening three times daily.  Use a pea-sized amount.  Store at room temperature. 60 g 0     nitroGLYcerin (RECTIV) 0.4 % OINT rectal ointment Place 1 inch (1.5 mg) rectally every 12 hours .  Wear a glove when applying. 30 g 0     methylcellulose (CITRUCEL) 500 MG TABS tablet Take 1 tablet (500 mg) by mouth daily 30 each 0     lidocaine (XYLOCAINE) 2 % topical gel Apply topically 3 times daily Place a pea sized amount on gloved finger and apply rectally. 30 mL 0       Allergies:  Allergies   Allergen Reactions     Aspirin Swelling     \"my face gets swollen\"     Ibuprofen Swelling     \"My face gets swollen\"       Family history:  No family history on file.    Social history:  Social History   Substance Use Topics     Smoking status: Current Some Day Smoker " "    Smokeless tobacco: Never Used     Alcohol use No    Marital status: .    Nursing Notes:   Diyva Freeman, LPN  2/5/2018  3:59 PM  Signed  Chief Complaint   Patient presents with     Clinic Care Coordination - Initial     new       Vitals:    02/05/18 1557   BP: 120/87   Pulse: 90   Temp: 97.4  F (36.3  C)   TempSrc: Oral   SpO2: 99%   Weight: 130 lb 4.8 oz   Height: 5' 3\"       Body mass index is 23.08 kg/(m^2).  Divya CHEW AMAIRANI                       Physical Examination:  /87  Pulse 90  Temp 97.4  F (36.3  C) (Oral)  Ht 5' 3\"  Wt 130 lb 4.8 oz  SpO2 99%  BMI 23.08 kg/m2  General: Alert, oriented, in no acute distress, sitting comfortably  HEENT: Mucous membranes moist  Perianal external examination:  Perianal skin: Intact with no excoriation or lichenification.  Lesions: No evidence of an external lesion, nodularity, or induration in the perianal region.  Eversion of buttocks: There is some scarring in the right lateral position and prolapsing internal hemorrhoids present without bleeding.  Exam limited due to the extent of hemorrhoids.    Digital rectal examination: was very limited due to discomfort.     Total face to face time was 15 minutes, >50% counseling.    SABINA Morin, NP-C  Colon and Rectal Surgery   [facility]    This note was created using speech recognition software and may contain unintended word substitutions.  "

## 2018-02-05 NOTE — NURSING NOTE
"Chief Complaint   Patient presents with     Clinic Care Coordination - Initial     new       Vitals:    02/05/18 1557   BP: 120/87   Pulse: 90   Temp: 97.4  F (36.3  C)   TempSrc: Oral   SpO2: 99%   Weight: 130 lb 4.8 oz   Height: 5' 3\"       Body mass index is 23.08 kg/(m^2).  Divya CHEW LPN                    "

## 2018-02-07 ENCOUNTER — TELEPHONE (OUTPATIENT)
Dept: SURGERY | Facility: CLINIC | Age: 43
End: 2018-02-07

## 2018-02-07 NOTE — TELEPHONE ENCOUNTER
Patient is scheduled to see Urology 2/15/18 at 8:20 am.  Called patient to inform him of appointment, and that we will wait to schedule surgery once we know the plan of care from Urology (as patient called via call center today requesting to schedule surgery).  Patient did not answer and voicemail box is full.  Will attempt to reach patient at a later time.

## 2018-02-08 ENCOUNTER — PRE VISIT (OUTPATIENT)
Dept: UROLOGY | Facility: CLINIC | Age: 43
End: 2018-02-08

## 2018-02-08 NOTE — TELEPHONE ENCOUNTER
Patient with history of prostatitis coming in for consult with Dr. Fischer. Patient chart reviewed, no need for call, all records available and ready for appointment.

## 2018-02-15 ENCOUNTER — OFFICE VISIT (OUTPATIENT)
Dept: UROLOGY | Facility: CLINIC | Age: 43
End: 2018-02-15

## 2018-02-15 VITALS
HEART RATE: 75 BPM | SYSTOLIC BLOOD PRESSURE: 122 MMHG | WEIGHT: 130 LBS | BODY MASS INDEX: 23.04 KG/M2 | HEIGHT: 63 IN | DIASTOLIC BLOOD PRESSURE: 79 MMHG

## 2018-02-15 DIAGNOSIS — K62.89 RECTAL PAIN: ICD-10-CM

## 2018-02-15 DIAGNOSIS — R30.0 DYSURIA: Primary | ICD-10-CM

## 2018-02-15 LAB
ALBUMIN UR-MCNC: NEGATIVE MG/DL
APPEARANCE UR: CLEAR
BILIRUB UR QL STRIP: NEGATIVE
COLOR UR AUTO: YELLOW
GLUCOSE UR STRIP-MCNC: NEGATIVE MG/DL
HGB UR QL STRIP: NEGATIVE
KETONES UR STRIP-MCNC: 5 MG/DL
LEUKOCYTE ESTERASE UR QL STRIP: NEGATIVE
MUCOUS THREADS #/AREA URNS LPF: PRESENT /LPF
NITRATE UR QL: NEGATIVE
PH UR STRIP: 5 PH (ref 5–7)
RBC #/AREA URNS AUTO: <1 /HPF (ref 0–2)
SOURCE: ABNORMAL
SP GR UR STRIP: 1.02 (ref 1–1.03)
SQUAMOUS #/AREA URNS AUTO: 1 /HPF (ref 0–1)
UROBILINOGEN UR STRIP-MCNC: 0 MG/DL (ref 0–2)
WBC #/AREA URNS AUTO: 2 /HPF (ref 0–2)

## 2018-02-15 PROCEDURE — 87086 URINE CULTURE/COLONY COUNT: CPT | Performed by: UROLOGY

## 2018-02-15 ASSESSMENT — ENCOUNTER SYMPTOMS
BOWEL INCONTINENCE: 1
RECTAL PAIN: 1
CONSTIPATION: 0
BLOOD IN STOOL: 1
VOMITING: 0
HEARTBURN: 1
JAUNDICE: 0
FLANK PAIN: 0
ABDOMINAL PAIN: 1
NAUSEA: 0
DIARRHEA: 0
HEMATURIA: 0
DIFFICULTY URINATING: 0
BLOATING: 0

## 2018-02-15 ASSESSMENT — PAIN SCALES - GENERAL: PAINLEVEL: MILD PAIN (3)

## 2018-02-15 NOTE — PROGRESS NOTES
We are pleased to see Mr. Fermin Atwood in consultation at the request of Dr. Aviles for the evaluation of chief complaint listed below    Chief Complaint:    Rectal pain, r/o prostatitis         History of Present Illness:   43M with h/o  history of recurrent anal fissures s/p mltuiple procedures, most recently an EUA, sphincterotomy, and proctoscopy in 2009. He was followed by colorectal clinic for persistent/worsening rectal pain that was initially with hard bowel movements, with no improvement after a trial of preparation H nor with topical diltiazem. He then reported that the pain is becoming worse in intensity and is also  now presenting with urinary frequency/urgency when he saw colorectal on 2/5/2018. He was unable to tolerate a ROBIN at the time. He was thus referred to our clinic to r/o prostatitis as a possible cause of this persistent pain.     He continues to report the rectal pain along with bloody stools (he also has a history of hemorrhoids) for the past 3 months. He is somewhat constipated. He states that he started noting urinary symptoms - mainly pressure in the bladder during the pain episodes, along with very minimal incontinence (drops of urine) with straining to defecate. He denies hematuria/dysuria. He endorses some hesitancy when his rectal pain worsens.     He denies prior urologic hx or history of UTIs. He did require catheterization due to urinary retention in the immediate post-op period following his aforementioned rectal surgery in 2009.     PVR today is zero           Past Medical History:   Negative other than the history of anal fissures and hemorrhoids         Past Surgical History:   Above rectal surgeries         Social History:   Works as a self-employed contractor.  with  children  Smoking: never  Alcohol: no  IV Drug Use: None         Family History:   No urologic cancers in the family.         Allergies:   ASA and ibuprofen         Medications:     Current  "Outpatient Prescriptions   Medication Sig     Witch Hazel (PREPARATION H EX)      diltiazem 2% in PLO cream, FV COMPOUNDED, 2% GEL To anal opening three times daily.  Use a pea-sized amount.  Store at room temperature.     diltiazem 2% in PLO cream, FV COMPOUNDED, 2% GEL To anal opening three times daily.  Use a pea-sized amount.  Store at room temperature.     nitroGLYcerin (RECTIV) 0.4 % OINT rectal ointment Place 1 inch (1.5 mg) rectally every 12 hours .  Wear a glove when applying.     methylcellulose (CITRUCEL) 500 MG TABS tablet Take 1 tablet (500 mg) by mouth daily     lidocaine (XYLOCAINE) 2 % topical gel Apply topically 3 times daily Place a pea sized amount on gloved finger and apply rectally.     No current facility-administered medications for this visit.             REVIEW OF SYSTEMS:    See HPI for pertinent details.  Remainder of 10-point ROS negative.         PHYSICAL EXAM   /79  Pulse 75  Ht 1.6 m (5' 3\")  Wt 59 kg (130 lb)  BMI 23.03 kg/m2  GENERAL: No acute distress. Well nourished.   HEENT:  Sclerae anicteric.  Conjunctivae pink.  Moist mucous membranes.  LUNGS:  Non-labored breathing  :  Phallus circumcised, meatus adequate, no plaques palpated. Testes descended bilaterally, no intratesticular masses.  Epididymes non-tender.  No varicoceles or inguinal hernias.  RECTAL:  Good tone.  Large tender hemorrhoid noted, limiting assessment of prostate   SKIN: No rashes.  Dry.     EXTREMITIES:  Warm, well perfused.  No lower extremity edema bilaterally.  NEURO: normal gait, no focal deficits.           LABS AND IMAGING:   Unremarkable  No recent imaging          ASSESSMENT:   Fermin Atwood is a 43 year old male with history of recurrent anal fissures s/p mltuiple procedures, most recently an EUA, sphincterotomy, and proctoscopy in 2009. He was followed by colorectal clinic for persistent/worsening rectal pain and then reported some urinary symptoms - bladder pressure during rectal pain " episodes, occasional minimal dribbling with straining, but no hematuria/dysuria/weak stream.   ROBIN today was very limited due to his large hemorrhoid  PVR was zero    Explained to patient that his urinary symptoms could be 2/2 prostatis or due to referred rectal pain. We will collect a UA/UC and semen culture to r/o prostatitis. Given that we were unable to do the ROBIN to assess the prostate, he may benefit from a pelvic MRI for further assessment. He is only 42 yo and so would not recommend PSA/prostate cancer screening.             PLAN:   UA/UC today  Semen analysis and culture suggested- pt declines doing this test.  PVR 0cc today  Referral back for proctoscopy  Pelvic MRI to rule-out prostatitis/ fistulas- pt requests.  This was ordered.    Vicenta Evans MD MS  Urology Resident    Patient was seen and examined with Dr. Amado Aviles      I saw and examined the patient with the resident today.  I agree with the resident note and plan of care as above.     Octavio Fischer MD  Urology Staff

## 2018-02-15 NOTE — MR AVS SNAPSHOT
After Visit Summary   2/15/2018    Fermin Atwood    MRN: [id number]           Patient Information     Date Of Birth          1975        Visit Information        Provider Department      2/15/2018 8:20 AM Octavio Fischer MD [facility] Urology and [facility] for Prostate and Urologic Cancers        Today's Diagnoses     Dysuria    -  1    Rectal pain          Care Instructions    Schedule imaging (CT).  Dr. Fischer will contact you with the results.        It was a pleasure meeting with you today.  Thank you for allowing me and my team the privilege of caring for you today.  YOU are the reason we are here, and I truly hope we provided you with the excellent service you deserve.  Please let us know if there is anything else we can do for you so that we can be sure you are leaving completely satisfied with your care experience.                  Follow-ups after your visit        Your next 10 appointments already scheduled     Feb 21, 2018  6:15 PM CST   (Arrive by 6:00 PM)   MR PROSTATE with [id number]   [facility] ([facility])    [address] [place] [id number] [phone]           Take your medicines as usual, unless your doctor tells you not to. Bring a list of your current medicines to your exam (including vitamins, minerals and over-the-counter drugs).  You may or may not receive intravenous (IV) contrast for this exam pending the discretion of the Radiologist.  You do not need to do anything special to prepare.  The MRI machine uses a strong magnet. Please wear clothes without metal (snaps, zippers). A sweatsuit works well, or we may give you a hospital gown.  Please remove any body piercings and hair extensions before you arrive. You will also remove watches, jewelry, hairpins, wallets, dentures, partial dental plates and hearing aids. You may wear contact lenses, and you may be able to wear your rings. We have a safe place to  keep your personal items, but it is safer to leave them at home.  **IMPORTANT** THE INSTRUCTIONS BELOW ARE ONLY FOR THOSE PATIENTS WHO HAVE BEEN PRESCRIBED SEDATION OR GENERAL ANESTHESIA DURING THEIR MRI PROCEDURE:  IF YOUR DOCTOR PRESCRIBED ORAL SEDATION (take medicine to help you relax during your exam):   You must get the medicine from your doctor (oral medication) before you arrive. Bring the medicine to the exam. Do not take it at home. You ll be told when to take it upon arriving for your exam.   Arrive one hour early. Bring someone who can take you home after the test. Your medicine will make you sleepy. After the exam, you may not drive, take a bus or take a taxi by yourself.  IF YOUR DOCTOR PRESCRIBED IV SEDATION:   Arrive one hour early. Bring someone who can take you home after the test. Your medicine will make you sleepy. After the exam, you may not drive, take a bus or take a taxi by yourself.   No eating 6 hours before your exam. You may have clear liquids up until 4 hours before your exam. (Clear liquids include water, clear tea, black coffee and fruit juice without pulp.)  IF YOUR DOCTOR PRESCRIBED ANESTHESIA (be asleep for your exam):   Arrive 1 1/2 hours early. Bring someone who can take you home after the test. You may not drive, take a bus or take a taxi by yourself.   No eating 8 hours before your exam. You may have clear liquids up until 4 hours before your exam. (Clear liquids include water, clear tea, black coffee and fruit juice without pulp.)   You will spend four to five hours in the recovery room.  Please call the Imaging Department at your exam site with any questions.              Future tests that were ordered for you today     Open Future Orders        Priority Expected Expires Ordered    MRI Prostate Routine  2/15/2019 2/15/2018            Who to contact     Please call your clinic at [phone] to:    Ask questions about your health    Make or cancel appointments    Discuss your  "medicines    Learn about your test results    Speak to your doctor            Additional Information About Your Visit        MyChart Information     [employer] is an electronic gateway that provides easy, online access to your medical records. With Sendmail, you can request a clinic appointment, read your test results, renew a prescription or communicate with your care team.     To sign up for Sendmail visit the website at [email or web address]   You will be asked to enter the access code listed below, as well as some personal information. Please follow the directions to create your username and password.     Your access code is: [email or web address]  Expires: 3/14/2018  8:20 PM     Your access code will  in 90 days. If you need help or a new code, please contact your [facility] or call [phone] for assistance.        Care EveryWhere ID     This is your Care EveryWhere ID. This could be used by other organizations to access your [place] medical records  [id number]        Your Vitals Were     Pulse Height BMI (Body Mass Index)             75 1.6 m (5' 3\") 23.03 kg/m2          Blood Pressure from Last 3 Encounters:   02/15/18 122/79   18 120/87   17 115/88    Weight from Last 3 Encounters:   02/15/18 59 kg (130 lb)   18 59.1 kg (130 lb 4.8 oz)   17 56.7 kg (124 lb 14.4 oz)              We Performed the Following     MEASURE POST-VOID RESIDUAL URINE/BLADDER CAPACITY, US NON-IMAGING     Routine UA with microscopic - No culture     Urine Culture Aerobic Bacterial        Primary Care Provider Fax #    Physician No Ref-Primary [phone]       No address on file        Equal Access to Services     RAUL SARMIENTO : Hadii karl Herrera, alicia minyaa, qacarole hawkinsalnelia huang. So [facility] [phone].    ATENCIÓN: Si habla español, tiene a branch disposición servicios gratuitos de asistencia lingüística. Llame al " [phone].    We comply with applicable federal civil rights laws and [place] laws. We do not discriminate on the basis of race, color, national origin, age, disability, sex, sexual orientation, or gender identity.            Thank you!     Thank you for choosing [facility] UROLOGY AND [facility] FOR PROSTATE AND UROLOGIC CANCERS  for your care. Our goal is always to provide you with excellent care. Hearing back from our patients is one way we can continue to improve our services. Please take a few minutes to complete the written survey that you may receive in the mail after your visit with us. Thank you!             Your Updated Medication List - Protect others around you: Learn how to safely use, store and throw away your medicines at www.disposemymeds.org.          This list is accurate as of 2/15/18  9:47 AM.  Always use your most recent med list.                   Brand Name Dispense Instructions for use Diagnosis    * diltiazem 2% in PLO cream (FV COMPOUNDED) 2% Gel     60 g    To anal opening three times daily.  Use a pea-sized amount.  Store at room temperature.    Anal pain       * diltiazem 2% in PLO cream (FV COMPOUNDED) 2% Gel     60 g    To anal opening three times daily.  Use a pea-sized amount.  Store at room temperature.    Anal pain       lidocaine 2 % topical gel    XYLOCAINE    30 mL    Apply topically 3 times daily Place a pea sized amount on gloved finger and apply rectally.        methylcellulose 500 MG Tabs tablet    CITRUCEL    30 each    Take 1 tablet (500 mg) by mouth daily        nitroGLYcerin 0.4 % Oint rectal ointment    RECTIV    30 g    Place 1 inch (1.5 mg) rectally every 12 hours .  Wear a glove when applying.        PREPARATION H EX           * Notice:  This list has 2 medication(s) that are the same as other medications prescribed for you. Read the directions carefully, and ask your doctor or other care provider to review them with you.

## 2018-02-15 NOTE — NURSING NOTE
"Chief Complaint   Patient presents with     Consult     prostatitis       Blood pressure 122/79, pulse 75, height 1.6 m (5' 3\"), weight 59 kg (130 lb). Body mass index is 23.03 kg/(m^2).    There is no problem list on file for this patient.      Allergies   Allergen Reactions     Aspirin Swelling     \"my face gets swollen\"     Ibuprofen Swelling     \"My face gets swollen\"       Current Outpatient Prescriptions   Medication Sig Dispense Refill     Witch Hazel (PREPARATION H EX)        diltiazem 2% in PLO cream, FV COMPOUNDED, 2% GEL To anal opening three times daily.  Use a pea-sized amount.  Store at room temperature. 60 g 0     diltiazem 2% in PLO cream, FV COMPOUNDED, 2% GEL To anal opening three times daily.  Use a pea-sized amount.  Store at room temperature. 60 g 0     nitroGLYcerin (RECTIV) 0.4 % OINT rectal ointment Place 1 inch (1.5 mg) rectally every 12 hours .  Wear a glove when applying. 30 g 0     methylcellulose (CITRUCEL) 500 MG TABS tablet Take 1 tablet (500 mg) by mouth daily 30 each 0     lidocaine (XYLOCAINE) 2 % topical gel Apply topically 3 times daily Place a pea sized amount on gloved finger and apply rectally. 30 mL 0       Social History   Substance Use Topics     Smoking status: Current Some Day Smoker     Smokeless tobacco: Never Used     Alcohol use No       Carrie Doe LPN  2/15/2018  8:33 AM    "

## 2018-02-15 NOTE — LETTER
2/15/2018       RE: Fermin Atwood  [address]   [facility] [address]     Dear Colleague,    Thank you for referring your patient, Fermin Atwood, to the [facility] UROLOGY AND INST FOR PROSTATE AND UROLOGIC CANCERS at [facility]. Please see a copy of my visit note below.    We are pleased to see Mr. Fermin Atwood in consultation at the request of Dr. Aviles for the evaluation of chief complaint listed below    Chief Complaint:    Rectal pain, r/o prostatitis         History of Present Illness:   43M with h/o  history of recurrent anal fissures s/p mltuiple procedures, most recently an EUA, sphincterotomy, and proctoscopy in 2009. He was followed by colorectal clinic for persistent/worsening rectal pain that was initially with hard bowel movements, with no improvement after a trial of preparation H nor with topical diltiazem. He then reported that the pain is becoming worse in intensity and is also  now presenting with urinary frequency/urgency when he saw colorectal on 2/5/2018. He was unable to tolerate a ROBIN at the time. He was thus referred to our clinic to r/o prostatitis as a possible cause of this persistent pain.     He continues to report the rectal pain along with bloody stools (he also has a history of hemorrhoids) for the past 3 months. He is somewhat constipated. He states that he started noting urinary symptoms - mainly pressure in the bladder during the pain episodes, along with very minimal incontinence (drops of urine) with straining to defecate. He denies hematuria/dysuria. He endorses some hesitancy when his rectal pain worsens.     He denies prior urologic hx or history of UTIs. He did require catheterization due to urinary retention in the immediate post-op period following his aforementioned rectal surgery in 2009.     PVR today is zero           Past Medical History:   Negative other than the history of anal fissures  "and hemorrhoids         Past Surgical History:   Above rectal surgeries         Social History:   Works as a self-employed contractor.  with  children  Smoking: never  Alcohol: no  IV Drug Use: None         Family History:   No urologic cancers in the family.         Allergies:   ASA and ibuprofen         Medications:     Current Outpatient Prescriptions   Medication Sig     Witch Hazel (PREPARATION H EX)      diltiazem 2% in PLO cream, FV COMPOUNDED, 2% GEL To anal opening three times daily.  Use a pea-sized amount.  Store at room temperature.     diltiazem 2% in PLO cream, FV COMPOUNDED, 2% GEL To anal opening three times daily.  Use a pea-sized amount.  Store at room temperature.     nitroGLYcerin (RECTIV) 0.4 % OINT rectal ointment Place 1 inch (1.5 mg) rectally every 12 hours .  Wear a glove when applying.     methylcellulose (CITRUCEL) 500 MG TABS tablet Take 1 tablet (500 mg) by mouth daily     lidocaine (XYLOCAINE) 2 % topical gel Apply topically 3 times daily Place a pea sized amount on gloved finger and apply rectally.     No current facility-administered medications for this visit.             REVIEW OF SYSTEMS:    See HPI for pertinent details.  Remainder of 10-point ROS negative.         PHYSICAL EXAM   /79  Pulse 75  Ht 1.6 m (5' 3\")  Wt 59 kg (130 lb)  BMI 23.03 kg/m2  GENERAL: No acute distress. Well nourished.   HEENT:  Sclerae anicteric.  Conjunctivae pink.  Moist mucous membranes.  LUNGS:  Non-labored breathing  :  Phallus circumcised, meatus adequate, no plaques palpated. Testes descended bilaterally, no intratesticular masses.  Epididymes non-tender.  No varicoceles or inguinal hernias.  RECTAL:  Good tone.  Large tender hemorrhoid noted, limiting assessment of prostate   SKIN: No rashes.  Dry.     EXTREMITIES:  Warm, well perfused.  No lower extremity edema bilaterally.  NEURO: normal gait, no focal deficits.           LABS AND IMAGING:   Unremarkable  No recent imaging       "    ASSESSMENT:   Fermin Atwood is a 43 year old male with history of recurrent anal fissures s/p mltuiple procedures, most recently an EUA, sphincterotomy, and proctoscopy in 2009. He was followed by colorectal clinic for persistent/worsening rectal pain and then reported some urinary symptoms - bladder pressure during rectal pain episodes, occasional minimal dribbling with straining, but no hematuria/dysuria/weak stream.   ROBIN today was very limited due to his large hemorrhoid  PVR was zero    Explained to patient that his urinary symptoms could be 2/2 prostatis or due to referred rectal pain. We will collect a UA/UC and semen culture to r/o prostatitis. Given that we were unable to do the ROBIN to assess the prostate, he may benefit from a pelvic MRI for further assessment. He is only 42 yo and so would not recommend PSA/prostate cancer screening.             PLAN:   UA/UC today  Semen analysis and culture suggested- pt declines doing this test.  PVR 0cc today  Referral back for proctoscopy  Pelvic MRI to rule-out prostatitis/ fistulas- pt requests.  This was ordered.    Vicenta Evans MD MS  Urology Resident    Patient was seen and examined with Dr. Amado Aviles      I saw and examined the patient with the resident today.  I agree with the resident note and plan of care as above.       Again, thank you for allowing me to participate in the care of your patient.      Sincerely,    Octavio Fischer MD

## 2018-02-15 NOTE — LETTER
February 23, 2018       TO: Fermin Atwood  [address]   [facility] [address]       Dear ,    We are writing to inform you of your test results.    Your test results fall within the expected range(s) or remain unchanged from previous results.  Please continue with current treatment plan.    Resulted Orders   Routine UA with microscopic - No culture   Result Value Ref Range    Color Urine Yellow     Appearance Urine Clear     Glucose Urine Negative NEG^Negative mg/dL    Bilirubin Urine Negative NEG^Negative    Ketones Urine 5 (A) NEG^Negative mg/dL    Specific Gravity Urine 1.025 1.003 - 1.035    Blood Urine Negative NEG^Negative    pH Urine 5.0 5.0 - 7.0 pH    Protein Albumin Urine Negative NEG^Negative mg/dL    Urobilinogen mg/dL 0.0 0.0 - 2.0 mg/dL    Nitrite Urine Negative NEG^Negative    Leukocyte Esterase Urine Negative NEG^Negative    Source Midstream Urine     WBC Urine 2 0 - 2 /HPF    RBC Urine <1 0 - 2 /HPF    Squamous Epithelial /HPF Urine 1 0 - 1 /HPF    Mucous Urine Present (A) NEG^Negative /LPF   Urine Culture Aerobic Bacterial   Result Value Ref Range    Specimen Description Midstream Urine     Special Requests Specimen received in preservative     Culture Micro <10,000 colonies/mL  urogenital norah          Octavio Fischer MD

## 2018-02-15 NOTE — PATIENT INSTRUCTIONS
Schedule imaging (CT).  Dr. Fischer will contact you with the results.        It was a pleasure meeting with you today.  Thank you for allowing me and my team the privilege of caring for you today.  YOU are the reason we are here, and I truly hope we provided you with the excellent service you deserve.  Please let us know if there is anything else we can do for you so that we can be sure you are leaving completely satisfied with your care experience.

## 2018-02-16 LAB
BACTERIA SPEC CULT: NORMAL
Lab: NORMAL
SPECIMEN SOURCE: NORMAL